# Patient Record
Sex: FEMALE | Race: WHITE | Employment: UNEMPLOYED | URBAN - NONMETROPOLITAN AREA
[De-identification: names, ages, dates, MRNs, and addresses within clinical notes are randomized per-mention and may not be internally consistent; named-entity substitution may affect disease eponyms.]

---

## 2017-09-12 ENCOUNTER — OFFICE VISIT (OUTPATIENT)
Dept: SURGERY | Age: 57
End: 2017-09-12

## 2017-09-12 VITALS
OXYGEN SATURATION: 98 % | HEART RATE: 78 BPM | WEIGHT: 206 LBS | TEMPERATURE: 97 F | DIASTOLIC BLOOD PRESSURE: 80 MMHG | BODY MASS INDEX: 29.49 KG/M2 | HEIGHT: 70 IN | SYSTOLIC BLOOD PRESSURE: 120 MMHG | RESPIRATION RATE: 16 BRPM

## 2017-09-12 DIAGNOSIS — Z12.11 COLON CANCER SCREENING: Primary | ICD-10-CM

## 2017-09-12 PROCEDURE — 99999 PR OFFICE/OUTPT VISIT,PROCEDURE ONLY: CPT | Performed by: SURGERY

## 2017-09-12 ASSESSMENT — ENCOUNTER SYMPTOMS
BLOOD IN STOOL: 0
EYE DISCHARGE: 0
COUGH: 0
VOICE CHANGE: 0
EYE REDNESS: 0
WHEEZING: 0
EYE ITCHING: 0
STRIDOR: 0
RECTAL PAIN: 0
BACK PAIN: 0
SHORTNESS OF BREATH: 0
CHOKING: 0
ABDOMINAL DISTENTION: 0
TROUBLE SWALLOWING: 0
DIARRHEA: 0
COLOR CHANGE: 0
ABDOMINAL PAIN: 0
SORE THROAT: 0
VOMITING: 0
EYE PAIN: 0
PHOTOPHOBIA: 0
RHINORRHEA: 0
SINUS PRESSURE: 0
FACIAL SWELLING: 0
APNEA: 0
ANAL BLEEDING: 0
NAUSEA: 0
CHEST TIGHTNESS: 0
CONSTIPATION: 0

## 2017-09-22 ENCOUNTER — TELEPHONE (OUTPATIENT)
Dept: SURGERY | Age: 57
End: 2017-09-22

## 2017-10-18 ENCOUNTER — HOSPITAL ENCOUNTER (OUTPATIENT)
Age: 57
Setting detail: OUTPATIENT SURGERY
Discharge: HOME | End: 2017-10-18
Attending: SURGERY | Admitting: SURGERY
Payer: COMMERCIAL

## 2017-10-18 VITALS
HEIGHT: 69 IN | OXYGEN SATURATION: 95 % | SYSTOLIC BLOOD PRESSURE: 96 MMHG | WEIGHT: 200.6 LBS | TEMPERATURE: 97.2 F | HEART RATE: 64 BPM | DIASTOLIC BLOOD PRESSURE: 68 MMHG | BODY MASS INDEX: 29.71 KG/M2 | RESPIRATION RATE: 16 BRPM

## 2017-10-18 PROCEDURE — 99152 MOD SED SAME PHYS/QHP 5/>YRS: CPT | Performed by: SURGERY

## 2017-10-18 PROCEDURE — 7100000000 HC PACU RECOVERY - FIRST 15 MIN: Performed by: SURGERY

## 2017-10-18 PROCEDURE — 88305 TISSUE EXAM BY PATHOLOGIST: CPT

## 2017-10-18 PROCEDURE — 2580000003 HC RX 258: Performed by: SURGERY

## 2017-10-18 PROCEDURE — 3609010600 HC COLONOSCOPY POLYPECTOMY SNARE/COLD BIOPSY: Performed by: SURGERY

## 2017-10-18 PROCEDURE — 99153 MOD SED SAME PHYS/QHP EA: CPT | Performed by: SURGERY

## 2017-10-18 PROCEDURE — 6360000002 HC RX W HCPCS: Performed by: SURGERY

## 2017-10-18 PROCEDURE — 45380 COLONOSCOPY AND BIOPSY: CPT | Performed by: SURGERY

## 2017-10-18 RX ORDER — 0.9 % SODIUM CHLORIDE 0.9 %
10 VIAL (ML) INJECTION PRN
Status: DISCONTINUED | OUTPATIENT
Start: 2017-10-18 | End: 2017-10-18 | Stop reason: HOSPADM

## 2017-10-18 RX ORDER — ASCORBIC ACID 500 MG
500 TABLET ORAL DAILY
COMMUNITY

## 2017-10-18 RX ORDER — FENTANYL CITRATE 50 UG/ML
INJECTION, SOLUTION INTRAMUSCULAR; INTRAVENOUS PRN
Status: DISCONTINUED | OUTPATIENT
Start: 2017-10-18 | End: 2017-10-18 | Stop reason: HOSPADM

## 2017-10-18 RX ORDER — MIDAZOLAM HYDROCHLORIDE 1 MG/ML
INJECTION INTRAMUSCULAR; INTRAVENOUS PRN
Status: DISCONTINUED | OUTPATIENT
Start: 2017-10-18 | End: 2017-10-18 | Stop reason: HOSPADM

## 2017-10-18 RX ORDER — 0.9 % SODIUM CHLORIDE 0.9 %
10 VIAL (ML) INJECTION EVERY 12 HOURS SCHEDULED
Status: DISCONTINUED | OUTPATIENT
Start: 2017-10-18 | End: 2017-10-18 | Stop reason: HOSPADM

## 2017-10-18 RX ORDER — FLUTICASONE PROPIONATE 110 UG/1
1 AEROSOL, METERED RESPIRATORY (INHALATION) 2 TIMES DAILY
COMMUNITY

## 2017-10-18 RX ORDER — SODIUM CHLORIDE 450 MG/100ML
INJECTION, SOLUTION INTRAVENOUS CONTINUOUS
Status: DISCONTINUED | OUTPATIENT
Start: 2017-10-18 | End: 2017-10-18 | Stop reason: HOSPADM

## 2017-10-18 RX ADMIN — SODIUM CHLORIDE: 4.5 INJECTION, SOLUTION INTRAVENOUS at 09:46

## 2017-10-18 NOTE — H&P
[address] [address]  Sedation/Analgesia History & Physical  Dr. Charisse Santo    Pt Name: Ashley Fink  MRN: [id number]  YOB: 1960  Provider Performing Procedure: [place] May  Primary Care Physician: Ish Marquez NP  PRE-PROCEDURE   DNR-CCA/DNR-CC - No  Brief History/Pre-Procedure Diagnosis: screening    MEDICAL HISTORY       has no past medical history on file. SURGICAL HISTORY   has no past surgical history on file. ALLERGIES   Allergies as of 09/12/2017    (No Known Allergies)     MEDICATIONS   Coumadin Use Last 7 Days: No  Antiplatelet drug therapy use last 7 days: No  Other anticoagulant use last 7 days: No  Prior to Admission medications    Medication Sig Start Date End Date Taking? Authorizing Provider   vitamin C (ASCORBIC ACID) 500 MG tablet Take 500 mg by mouth daily   Yes Historical Provider, MD   fluticasone (FLOVENT HFA) 110 MCG/ACT inhaler Inhale 1 puff into the lungs 2 times daily   Yes Historical Provider, MD   Oxymetazoline HCl (NASAL SPRAY NA) by Nasal route   Yes Historical Provider, MD   Omega-3 Fatty Acids (FISH OIL PO) Take by mouth daily   Yes Historical Provider, MD   Multiple Vitamins-Minerals (THERAPEUTIC MULTIVITAMIN-MINERALS) tablet Take 1 tablet by mouth daily. Yes Historical Provider, MD   ibuprofen (ADVIL;MOTRIN) 800 MG tablet Take 800 mg by mouth every 6 hours as needed for Pain. Historical Provider, MD     PHYSICAL EXAMINATION   Vitals:  height is 5' 9\" (1.753 m) and weight is 200 lb 9.6 oz (91 kg). Her temperature is 97.7 °F (36.5 °C). Her blood pressure is 126/83 and her pulse is 66. Her respiration is 20 and oxygen saturation is 97%.    Mental Status: alert, cooperative  Heart:   Heart regular rate and rhythm     Lungs:  Clear      Abdomen:  Soft, nontender       PLANNED PROCEDURE   Colonoscopy  SEDATION   Planned agent for conscious sedation: Fentanyl, Versed  ASA Classification: 2  Class 1: A normal healthy

## 2017-11-06 DIAGNOSIS — Z12.11 COLON CANCER SCREENING: ICD-10-CM

## 2018-08-02 ENCOUNTER — HOSPITAL ENCOUNTER (OUTPATIENT)
Age: 58
Discharge: HOME | End: 2018-08-02
Payer: COMMERCIAL

## 2018-08-02 ENCOUNTER — HOSPITAL ENCOUNTER (OUTPATIENT)
Dept: GENERAL RADIOLOGY | Age: 58
Discharge: HOME | End: 2018-08-02
Payer: COMMERCIAL

## 2018-08-02 DIAGNOSIS — R06.02 BREATH SHORTNESS: ICD-10-CM

## 2018-08-02 PROCEDURE — 71046 X-RAY EXAM CHEST 2 VIEWS: CPT

## 2018-08-02 PROCEDURE — 2709999900 HC NON-CHARGEABLE SUPPLY

## 2018-09-11 ENCOUNTER — HOSPITAL ENCOUNTER (OUTPATIENT)
Age: 58
Discharge: HOME | End: 2018-09-11
Payer: COMMERCIAL

## 2018-09-11 LAB
APTT: 33.1 SECONDS (ref 22–38)
BASOPHILS # BLD: 0.6 %
BASOPHILS ABSOLUTE: 0 THOU/MM3 (ref 0–0.1)
EOSINOPHIL # BLD: 3.1 %
EOSINOPHILS ABSOLUTE: 0.2 THOU/MM3 (ref 0–0.4)
ERYTHROCYTE [DISTWIDTH] IN BLOOD BY AUTOMATED COUNT: 12.3 % (ref 11.5–14.5)
ERYTHROCYTE [DISTWIDTH] IN BLOOD BY AUTOMATED COUNT: 40 FL (ref 35–45)
HCT VFR BLD CALC: 45.2 % (ref 37–47)
HEMOGLOBIN: 15.7 GM/DL (ref 12–16)
IMMATURE GRANS (ABS): 0.02 THOU/MM3 (ref 0–0.07)
IMMATURE GRANULOCYTES: 0.3 %
INR BLD: 1 (ref 0.85–1.13)
LYMPHOCYTES # BLD: 30.8 %
LYMPHOCYTES ABSOLUTE: 2 THOU/MM3 (ref 1–4.8)
MCH RBC QN AUTO: 30.8 PG (ref 26–33)
MCHC RBC AUTO-ENTMCNC: 34.7 GM/DL (ref 32.2–35.5)
MCV RBC AUTO: 88.6 FL (ref 81–99)
MONOCYTES # BLD: 6.9 %
MONOCYTES ABSOLUTE: 0.4 THOU/MM3 (ref 0.4–1.3)
NUCLEATED RED BLOOD CELLS: 0 /100 WBC
PLATELET # BLD: 238 THOU/MM3 (ref 130–400)
PMV BLD AUTO: 10.5 FL (ref 9.4–12.4)
RBC # BLD: 5.1 MILL/MM3 (ref 4.2–5.4)
SEG NEUTROPHILS: 58.3 %
SEGMENTED NEUTROPHILS ABSOLUTE COUNT: 3.8 THOU/MM3 (ref 1.8–7.7)
WBC # BLD: 6.5 THOU/MM3 (ref 4.8–10.8)

## 2018-09-11 PROCEDURE — 86255 FLUORESCENT ANTIBODY SCREEN: CPT

## 2018-09-11 PROCEDURE — 85025 COMPLETE CBC W/AUTO DIFF WBC: CPT

## 2018-09-11 PROCEDURE — 85730 THROMBOPLASTIN TIME PARTIAL: CPT

## 2018-09-11 PROCEDURE — 85610 PROTHROMBIN TIME: CPT

## 2018-09-11 PROCEDURE — 36415 COLL VENOUS BLD VENIPUNCTURE: CPT

## 2018-09-11 PROCEDURE — 86003 ALLG SPEC IGE CRUDE XTRC EA: CPT

## 2018-09-13 LAB
ALLERGEN BERMUDA GRASS IGE: < 0.1 KU/L
ALLERGEN BIRCH IGE: < 0.1 KU/L
ALLERGEN BOX ELDER: < 0.1 KU/L
ALLERGEN CAT DANDER IGE: < 0.1 KU/L
ALLERGEN COMMON SHORT RAGWEED IGE: < 0.1 KU/L
ALLERGEN COTTONWOOD: < 0.1 KU/L
ALLERGEN D. FARINAE (DUST MITE): < 0.1 KU/L
ALLERGEN DOG DANDER IGE: < 0.1 KU/L
ALLERGEN ELM IGE: < 0.1 KU/L
ALLERGEN FUNGI/MOLD A. ALTERNATA IGE: < 0.1 KU/L
ALLERGEN FUNGI/MOLD A. FUMIGATUS IGE: < 0.1 KU/L
ALLERGEN FUNGI/MOLD M.RACEMOSUS IGE: < 0.1 KU/L
ALLERGEN FUNGI/MOLD P. NOTATUM IGE: < 0.1 KU/L
ALLERGEN GERMAN COCKROACH IGE: < 0.1 KU/L
ALLERGEN INTERPRETATION/SCORE: NORMAL
ALLERGEN MILK IGE: < 0.1 KU/L
ALLERGEN MITE DUST PTERONYSSINUS IGE: < 0.1 KU/L
ALLERGEN MOUNTAIN CEDAR: < 0.1 KU/L
ALLERGEN MOUSE EPITHELIA IGE: < 0.1 KU/L
ALLERGEN PEANUT (F13) IGE: < 0.1 KU/L
ALLERGEN PECAN TREE IGE: < 0.1 KU/L
ALLERGEN RUSSIAN THISTLE IGE: < 0.1 KU/L
ALLERGEN SHEEP SORREL (W18) IGE: < 0.1 KU/L
ALLERGEN TIMOTHY GRASS: < 0.1 KU/L
ALLERGEN TREE SYCAMORE: < 0.1 KU/L
ALLERGEN WALNUT TREE IGE: < 0.1 KU/L
ALLERGEN WEED, PIGWEED IGE: < 0.1 KU/L
ALLERGEN WHITE ASH: < 0.1 KU/L
ALLERGEN WHITE MULBERRY TREE, IGE: < 0.1 KU/L
ALLERGEN, FUNGI/MOLD: < 0.1 KU/L
ALLERGEN, TREE, OAK: < 0.1 KU/L
IMMUNOGLOBULIN E: 37 KU/L
NEUTROPHIL CYTOPLASMIC AB IGG: < 1

## 2018-09-21 ENCOUNTER — HOSPITAL ENCOUNTER (OUTPATIENT)
Dept: MAMMOGRAPHY | Age: 58
Discharge: HOME | End: 2018-09-21
Payer: COMMERCIAL

## 2018-09-21 DIAGNOSIS — Z12.39 SCREENING BREAST EXAMINATION: ICD-10-CM

## 2018-09-21 PROCEDURE — 77063 BREAST TOMOSYNTHESIS BI: CPT

## 2019-09-24 ENCOUNTER — HOSPITAL ENCOUNTER (OUTPATIENT)
Dept: MAMMOGRAPHY | Age: 59
Discharge: HOME | End: 2019-09-24
Payer: COMMERCIAL

## 2019-09-24 DIAGNOSIS — Z12.39 BREAST SCREENING: ICD-10-CM

## 2019-09-24 PROCEDURE — 77063 BREAST TOMOSYNTHESIS BI: CPT

## 2020-11-16 ENCOUNTER — HOSPITAL ENCOUNTER (OUTPATIENT)
Dept: MAMMOGRAPHY | Age: 60
Discharge: HOME | End: 2020-11-16
Payer: COMMERCIAL

## 2020-11-16 PROCEDURE — 77063 BREAST TOMOSYNTHESIS BI: CPT

## 2021-11-22 ENCOUNTER — HOSPITAL ENCOUNTER (OUTPATIENT)
Dept: MAMMOGRAPHY | Age: 61
Discharge: HOME | End: 2021-11-22
Payer: COMMERCIAL

## 2021-11-22 DIAGNOSIS — Z12.39 BREAST SCREENING: ICD-10-CM

## 2021-11-22 PROCEDURE — 77063 BREAST TOMOSYNTHESIS BI: CPT

## 2022-11-03 ENCOUNTER — HOSPITAL ENCOUNTER (OUTPATIENT)
Age: 62
Discharge: HOME | End: 2022-11-03
Payer: COMMERCIAL

## 2022-11-03 ENCOUNTER — HOSPITAL ENCOUNTER (OUTPATIENT)
Dept: CT IMAGING | Age: 62
Discharge: HOME | End: 2022-11-03
Payer: COMMERCIAL

## 2022-11-03 DIAGNOSIS — R10.32 LEFT LOWER QUADRANT PAIN: ICD-10-CM

## 2022-11-03 LAB
CREATININE, WHOLE BLOOD: 0.9 MG/DL (ref 0.5–1.2)
ESTIMATED GFR, PCACC: > 60 ML/MIN/1.73M2

## 2022-11-03 PROCEDURE — 74177 CT ABD & PELVIS W/CONTRAST: CPT

## 2022-11-03 PROCEDURE — 6360000004 HC RX CONTRAST MEDICATION: Performed by: NURSE PRACTITIONER

## 2022-11-03 RX ADMIN — IOPAMIDOL 100 ML: 755 INJECTION, SOLUTION INTRAVENOUS at 15:39

## 2022-11-07 ENCOUNTER — TELEPHONE (OUTPATIENT)
Dept: SURGERY | Age: 62
End: 2022-11-07

## 2022-11-07 NOTE — TELEPHONE ENCOUNTER
Called patient to schedule a NP (but est. Patient) appt with [facility] referral for Diverticulitis

## 2022-11-18 ENCOUNTER — OFFICE VISIT (OUTPATIENT)
Dept: SURGERY | Age: 62
End: 2022-11-18
Payer: COMMERCIAL

## 2022-11-18 VITALS
HEART RATE: 70 BPM | OXYGEN SATURATION: 98 % | BODY MASS INDEX: 29.55 KG/M2 | WEIGHT: 195 LBS | SYSTOLIC BLOOD PRESSURE: 138 MMHG | HEIGHT: 68 IN | TEMPERATURE: 97.6 F | DIASTOLIC BLOOD PRESSURE: 74 MMHG

## 2022-11-18 DIAGNOSIS — K57.32 DIVERTICULITIS OF LARGE INTESTINE WITHOUT PERFORATION OR ABSCESS WITHOUT BLEEDING: Primary | ICD-10-CM

## 2022-11-18 PROCEDURE — 99213 OFFICE O/P EST LOW 20 MIN: CPT | Performed by: SURGERY

## 2022-11-18 NOTE — LETTER
Mason De La Torre DO  [address] [facility]. [address]  [place] [address]  [phone]     Pt Name: Luzma Quevedo  Medical Record Number: [id number]  Date of Birth 1960   Today's Date: 11/18/2022    [facility] was evaluated in the office today. My assessment and plans are listed below. ASSESSMENT      Diagnosis Orders   1. Diverticulitis of large intestine without perforation or abscess without bleeding          Past Medical History:   Diagnosis Date    Diverticulitis 2022    Sleep apnea          PLANS:   1. First episode of uncomplicated diverticulitis, no perforation, abscess or bleeding. Last colonoscopy in 2017 showing only sigmoid diverticulosis. 2. No indications for repeat endoscopy given the diagnosis has already been established. 3. No indications for sigmoid colectomy with this being the first uncomplicated episode. Should she have recurrent episodes in the same sigmoid this would be reconsidered. 4. In the meantime, pt would benefit from a high fiber diet and or supplementation. If I can provide any additional assistance or you have any concerns, please feel free to contact me. Thank you for allowing to participate in the care of your patients. Sincerely,      Valorie Patel

## 2022-11-18 NOTE — PATIENT INSTRUCTIONS
High fiber diet, goal of 40 gm of dietary fiber per day. May supplement if unable to achieve by diet alone.

## 2022-11-18 NOTE — PROGRESS NOTES
Ava Ralph. [facility], [address]  [phone]  New Patient Evaluation in Office    Pt Name: Belen Horta  Date of Birth 1960   Today's Date: 11/18/2022  Medical Record Number: [id number]  Referring Provider: No ref. provider found  Primary Care Provider: LIZ Umanzor - LAURIE  Chief Complaint   Patient presents with    Surgical Consult     Est pt last seen in 2017-referred by Noemi Gallagher CNP - Acute diverticulitis. CT 11/3/22     ASSESSMENT       Diagnosis Orders   1. Diverticulitis of large intestine without perforation or abscess without bleeding          Past Medical History:   Diagnosis Date    Diverticulitis 2022    Sleep apnea           PLANS      First episode of uncomplicated diverticulitis, no perforation, abscess or bleeding. Last colonoscopy in 2017 showing only sigmoid diverticulosis. No indications for repeat endoscopy given the diagnosis has already been established. No indications for sigmoid colectomy with this being the first uncomplicated episode. Should she have recurrent episodes in the same sigmoid this would be reconsidered. In the meantime, pt would benefit from a high fiber diet and or supplementation. Vivian Hale is a 63 y.o. female seen in the consultation for evaluation of diverticulitis. She complains of 1 episodes of diverticulitis. This began  2 weeks ago. Her usual symptoms include LLQ abdominal pain. Symptoms are aggravated by activity and alleviated by pain medications and antibiotics. Her symptoms resolved within 24 hours of starting therapy. Symptoms have resolved since. She has not required admission to the hospital for these problems, has not had a previous abscess and has not had a previous percutaneous drainage. She has had a previous colonoscopy in 2017 showing sigmoid diverticulosis.    Past Medical History  Past Medical History:   Diagnosis Date Diverticulitis 2022    Sleep apnea      Past Surgical History  Past Surgical History:   Procedure Laterality Date    DC COLSC FLX W/RMVL OF TUMOR POLYP LESION SNARE TQ N/A 10/18/2017    COLONOSCOPY POLYPECTOMY SNARE/COLD BIOPSY performed by Aury Cunningham DO at [facility]     Medications  Current Outpatient Medications   Medication Sig Dispense Refill    BREO ELLIPTA 100-25 MCG/INH AEPB inhaler       vitamin C (ASCORBIC ACID) 500 MG tablet Take 500 mg by mouth daily      fluticasone (FLOVENT HFA) 110 MCG/ACT inhaler Inhale 1 puff into the lungs 2 times daily      Omega-3 Fatty Acids (FISH OIL PO) Take by mouth daily      ibuprofen (ADVIL;MOTRIN) 800 MG tablet Take 800 mg by mouth every 6 hours as needed for Pain. Multiple Vitamins-Minerals (THERAPEUTIC MULTIVITAMIN-MINERALS) tablet Take 1 tablet by mouth daily. Oxymetazoline HCl (NASAL SPRAY NA) by Nasal route (Patient not taking: Reported on 11/18/2022)       No current facility-administered medications for this visit. Allergies  has No Known Allergies. Family History  family history includes Diabetes in her brother; Heart Disease in her father and mother; High Blood Pressure in her father and mother; High Cholesterol in her father; Mental Illness in her mother. Social History   reports that she has never smoked. She has never used smokeless tobacco. She reports current alcohol use. She reports that she does not use drugs.   Health Screening Exams  Health Maintenance   Topic Date Due    Depression Screen  Never done    HIV screen  Never done    Hepatitis C screen  Never done    Cervical cancer screen  Never done    Diabetes screen  Never done    Lipids  Never done    Shingles vaccine (1 of 2) Never done    COVID-19 Vaccine (3 - Booster for Pfizer series) 06/02/2021    Flu vaccine (1) 08/01/2022    Breast cancer screen  11/22/2023    DTaP/Tdap/Td vaccine (2 - Td or Tdap) 11/17/2025    Colorectal Cancer Screen  10/18/2027    Hepatitis A vaccine  Aged Out    Hib vaccine  Aged Out    Meningococcal (ACWY) vaccine  Aged Out    Pneumococcal 0-64 years Vaccine  Aged Out     Review of Systems  Unless otherwise stated in HPI, ROS was completed and is negative. OBJECTIVE    VITALS:  height is 5' 8\" (1.727 m) and weight is 195 lb (88.5 kg). Her temperature is 97.6 °F (36.4 °C). Her blood pressure is 138/74 and her pulse is 70. Her oxygen saturation is 98%. Body mass index is 29.65 kg/m². CONSTITUTIONAL: Alert and oriented times 3, no acute distress and cooperative to examination with proper mood and affect. SKIN: Skin color, texture, turgor normal. No rashes or lesions. LYMPH: no cervical nodes, no inguinal nodes  HEENT: Head is normocephalic, atraumatic. EOMI, PERRLA. NECK: Supple, symmetrical, trachea midline, no adenopathy, thyroid symmetric, not enlarged and no tenderness, skin normal.  CHEST/LUNGS: chest symmetric with normal A/P diameter, normal respiratory rate and rhythm, lungs clear to auscultation without wheezes, rales or rhonchi. No accessory muscle use. Scars None   CARDIOVASCULAR: Heart sounds are normal.  Regular rate and rhythm without murmur, gallop or rub. Normal S1 and S2. Carotid and femoral pulses 2+/4 and equal bilaterally. ABDOMEN: Normal shape. No scar(s) present. Normal bowel sounds. No bruits. Soft, nontender, nondistended, no masses or organomegaly. no evidence of hernia. Percussion: Normal without hepatosplenomegally. RECTAL: deferred, not clinically indicated  NEUROLOGIC: There are no focalizing motor or sensory deficits. CN II-XII are grossly intact. Carolyn Sandhu EXTREMITIES: no cyanosis, no clubbing, and no edema. Thank you for the interesting evaluation. Further recommendations as listed above.        Electronically signed by Tia Conte DO on 11/18/2022 at 1:35 PM

## 2022-12-05 ENCOUNTER — HOSPITAL ENCOUNTER (OUTPATIENT)
Dept: MAMMOGRAPHY | Age: 62
Discharge: HOME | End: 2022-12-05
Payer: COMMERCIAL

## 2022-12-05 DIAGNOSIS — Z12.31 VISIT FOR SCREENING MAMMOGRAM: ICD-10-CM

## 2022-12-05 PROCEDURE — 77063 BREAST TOMOSYNTHESIS BI: CPT

## 2022-12-07 ENCOUNTER — TELEPHONE (OUTPATIENT)
Dept: SURGERY | Age: 62
End: 2022-12-07

## 2022-12-07 DIAGNOSIS — R30.0 DYSURIA: Primary | ICD-10-CM

## 2022-12-07 NOTE — TELEPHONE ENCOUNTER
Patient calling in stating she has discomfort in her vaginal area, thinks she may be having a flare up of her diverticulosis. Does she need an antibiotic or a UA?

## 2022-12-08 ENCOUNTER — NURSE ONLY (OUTPATIENT)
Dept: LAB | Age: 62
End: 2022-12-08

## 2022-12-08 LAB
BILIRUBIN URINE: NEGATIVE
BLOOD, URINE: NEGATIVE
CHARACTER, URINE: CLEAR
COLOR: YELLOW
GLUCOSE URINE: NEGATIVE MG/DL
KETONES, URINE: NEGATIVE
LEUKOCYTE ESTERASE, URINE: NEGATIVE
NITRITE, URINE: NEGATIVE
PH UA: 5.5 (ref 5–9)
PROTEIN UA: NEGATIVE
SPECIFIC GRAVITY, URINE: 1.01 (ref 1–1.03)
UROBILINOGEN, URINE: 0.2 EU/DL (ref 0–1)

## 2023-01-05 ENCOUNTER — NURSE ONLY (OUTPATIENT)
Dept: LAB | Age: 63
End: 2023-01-05

## 2023-01-12 LAB — CYTOLOGY THIN PREP PAP: NORMAL

## 2023-12-08 ENCOUNTER — HOSPITAL ENCOUNTER (OUTPATIENT)
Dept: MAMMOGRAPHY | Age: 63
Discharge: HOME | End: 2023-12-08
Payer: COMMERCIAL

## 2023-12-08 VITALS — HEIGHT: 68 IN | WEIGHT: 210 LBS | BODY MASS INDEX: 31.83 KG/M2

## 2023-12-08 DIAGNOSIS — Z12.39 BREAST SCREENING: ICD-10-CM

## 2023-12-08 PROCEDURE — 77063 BREAST TOMOSYNTHESIS BI: CPT

## 2024-01-05 ENCOUNTER — HOSPITAL ENCOUNTER (EMERGENCY)
Age: 64
Discharge: HOME | End: 2024-01-06
Attending: FAMILY MEDICINE
Payer: COMMERCIAL

## 2024-01-05 DIAGNOSIS — N20.1 URETERIC STONE: Primary | ICD-10-CM

## 2024-01-05 PROCEDURE — 96375 TX/PRO/DX INJ NEW DRUG ADDON: CPT

## 2024-01-05 PROCEDURE — 84703 CHORIONIC GONADOTROPIN ASSAY: CPT

## 2024-01-05 PROCEDURE — 81001 URINALYSIS AUTO W/SCOPE: CPT

## 2024-01-05 PROCEDURE — 96374 THER/PROPH/DIAG INJ IV PUSH: CPT

## 2024-01-05 PROCEDURE — 99284 EMERGENCY DEPT VISIT MOD MDM: CPT

## 2024-01-06 ENCOUNTER — APPOINTMENT (OUTPATIENT)
Dept: CT IMAGING | Age: 64
End: 2024-01-06
Payer: COMMERCIAL

## 2024-01-06 VITALS
DIASTOLIC BLOOD PRESSURE: 77 MMHG | WEIGHT: 207 LBS | BODY MASS INDEX: 31.47 KG/M2 | TEMPERATURE: 97.2 F | OXYGEN SATURATION: 97 % | RESPIRATION RATE: 18 BRPM | HEART RATE: 92 BPM | SYSTOLIC BLOOD PRESSURE: 134 MMHG

## 2024-01-06 LAB
ALBUMIN SERPL BCP-MCNC: 3.6 GM/DL (ref 3.4–5)
ALP SERPL-CCNC: 85 U/L (ref 46–116)
ALT SERPL W P-5'-P-CCNC: 49 U/L (ref 14–63)
AMORPH SED URNS QL MICRO: ABNORMAL
ANION GAP SERPL CALC-SCNC: 10 MEQ/L (ref 8–16)
AST SERPL W P-5'-P-CCNC: 23 U/L (ref 15–37)
BACTERIA URNS QL MICRO: ABNORMAL
BASOPHILS # BLD: 0.4 % (ref 0–3)
BASOPHILS ABSOLUTE: 0 THOU/MM3 (ref 0–0.1)
BILIRUB SERPL-MCNC: 0.4 MG/DL (ref 0.2–1)
BILIRUB UR QL STRIP.AUTO: ABNORMAL
BUN SERPL-MCNC: 24 MG/DL (ref 7–18)
CALCIUM SERPL-MCNC: 8.8 MG/DL (ref 8.5–10.1)
CASTS #/AREA URNS LPF: ABNORMAL /LPF
CHARACTER UR: ABNORMAL
CHLORIDE SERPL-SCNC: 104 MEQ/L (ref 98–107)
CO2 SERPL-SCNC: 24 MEQ/L (ref 21–32)
COLOR UR AUTO: ABNORMAL
CREAT SERPL-MCNC: 1.1 MG/DL (ref 0.6–1.3)
CRYSTALS VITF MICRO: ABNORMAL
EOSINOPHILS ABSOLUTE: 0.1 THOU/MM3 (ref 0–0.5)
EOSINOPHILS RELATIVE PERCENT: 0.5 % (ref 0–4)
EPI CELLS #/AREA URNS HPF: ABNORMAL /HPF
GFR SERPL CREATININE-BSD FRML MDRD: 57 ML/MIN/1.73M2
GLUCOSE SERPL-MCNC: 159 MG/DL (ref 74–106)
GLUCOSE UR QL STRIP.AUTO: NEGATIVE MG/DL
HCG UR QL: NEGATIVE
HCT VFR BLD CALC: 41.6 % (ref 37–47)
HEMOGLOBIN: 14 GM/DL (ref 12–16)
HGB UR STRIP.AUTO-MCNC: ABNORMAL MG/L
IMMATURE GRANS (ABS): 0.04 THOU/MM3 (ref 0–0.07)
IMMATURE GRANULOCYTES: 0 %
KETONES UR QL STRIP.AUTO: ABNORMAL
LACTATE: 2.1 MMOL/L (ref 0.9–1.7)
LEUKOCYTE ESTERASE UR QL STRIP.AUTO: NEGATIVE
LYMPHOCYTES # BLD AUTO: 10.6 % (ref 15–47)
LYMPHOCYTES ABSOLUTE: 1 THOU/MM3 (ref 1–4.8)
MCH RBC QN AUTO: 30.6 PG (ref 26–32)
MCHC RBC AUTO-ENTMCNC: 33.7 GM/DL (ref 31–35)
MCV RBC AUTO: 91 FL (ref 81–99)
MONOCYTES: 0.8 THOU/MM3 (ref 0.3–1.3)
MONOCYTES: 8.4 % (ref 0–12)
MUCOUS THREADS URNS QL MICRO: ABNORMAL
NITRITE UR QL STRIP.AUTO: NEGATIVE
PDW BLD-RTO: 12.5 % (ref 11.5–14.9)
PH UR STRIP.AUTO: 5.5 [PH] (ref 5–9)
PLATELET # BLD AUTO: 244 THOU/MM3 (ref 130–400)
PMV BLD AUTO: 9.8 FL (ref 9.4–12.4)
POTASSIUM SERPL-SCNC: 4.1 MEQ/L (ref 3.5–5.1)
PROT SERPL-MCNC: 7.3 GM/DL (ref 6.4–8.2)
PROT UR STRIP.AUTO-MCNC: 30 MG/DL
RBC # BLD: 4.57 MILL/MM3 (ref 4.1–5.3)
RBC #/AREA URNS HPF: > 200 /HPF
REFLEX TO URINE C & S: ABNORMAL
SEG NEUTROPHILS: 79.7 % (ref 43–75)
SEGMENTED NEUTROPHILS ABSOLUTE COUNT: 7.6 THOU/MM3 (ref 1.8–7.7)
SODIUM SERPL-SCNC: 138 MEQ/L (ref 136–145)
SP GR UR STRIP.AUTO: 1.02 (ref 1–1.03)
UROBILINOGEN UR STRIP-ACNC: 0.2 EU/DL (ref 0–1)
WBC # BLD: 9.5 THOU/MM3 (ref 4.8–10.8)
WBC # UR STRIP.AUTO: ABNORMAL /HPF

## 2024-01-06 PROCEDURE — 80053 COMPREHEN METABOLIC PANEL: CPT

## 2024-01-06 PROCEDURE — 6370000000 HC RX 637 (ALT 250 FOR IP): Performed by: FAMILY MEDICINE

## 2024-01-06 PROCEDURE — 6360000002 HC RX W HCPCS: Performed by: FAMILY MEDICINE

## 2024-01-06 PROCEDURE — 85025 COMPLETE CBC W/AUTO DIFF WBC: CPT

## 2024-01-06 PROCEDURE — 74176 CT ABD & PELVIS W/O CONTRAST: CPT

## 2024-01-06 PROCEDURE — 83605 ASSAY OF LACTIC ACID: CPT

## 2024-01-06 RX ORDER — TAMSULOSIN HYDROCHLORIDE 0.4 MG/1
0.4 CAPSULE ORAL DAILY
Qty: 5 CAPSULE | Refills: 0 | Status: SHIPPED | OUTPATIENT
Start: 2024-01-06 | End: 2024-01-11

## 2024-01-06 RX ORDER — CIPROFLOXACIN 500 MG/1
500 TABLET, FILM COATED ORAL 2 TIMES DAILY
Qty: 14 TABLET | Refills: 0 | Status: SHIPPED | OUTPATIENT
Start: 2024-01-06 | End: 2024-01-13

## 2024-01-06 RX ORDER — CIPROFLOXACIN 500 MG/1
500 TABLET, FILM COATED ORAL ONCE
Status: COMPLETED | OUTPATIENT
Start: 2024-01-06 | End: 2024-01-06

## 2024-01-06 RX ORDER — ONDANSETRON 2 MG/ML
4 INJECTION INTRAMUSCULAR; INTRAVENOUS ONCE
Status: COMPLETED | OUTPATIENT
Start: 2024-01-06 | End: 2024-01-06

## 2024-01-06 RX ORDER — 0.9 % SODIUM CHLORIDE 0.9 %
1000 INTRAVENOUS SOLUTION INTRAVENOUS ONCE
Status: DISCONTINUED | OUTPATIENT
Start: 2024-01-06 | End: 2024-01-06 | Stop reason: HOSPADM

## 2024-01-06 RX ORDER — TAMSULOSIN HYDROCHLORIDE 0.4 MG/1
0.4 CAPSULE ORAL ONCE
Status: COMPLETED | OUTPATIENT
Start: 2024-01-06 | End: 2024-01-06

## 2024-01-06 RX ADMIN — ONDANSETRON 4 MG: 2 INJECTION INTRAMUSCULAR; INTRAVENOUS at 00:21

## 2024-01-06 RX ADMIN — HYDROMORPHONE HYDROCHLORIDE 0.5 MG: 1 INJECTION, SOLUTION INTRAMUSCULAR; INTRAVENOUS; SUBCUTANEOUS at 00:21

## 2024-01-06 RX ADMIN — CIPROFLOXACIN 500 MG: 500 TABLET, FILM COATED ORAL at 02:35

## 2024-01-06 RX ADMIN — TAMSULOSIN HYDROCHLORIDE 0.4 MG: 0.4 CAPSULE ORAL at 02:35

## 2024-01-06 ASSESSMENT — PAIN SCALES - GENERAL: PAINLEVEL_OUTOF10: 4

## 2024-01-06 ASSESSMENT — ENCOUNTER SYMPTOMS
NAUSEA: 0
VOMITING: 0
ABDOMINAL PAIN: 1

## 2024-01-06 ASSESSMENT — PAIN DESCRIPTION - ORIENTATION: ORIENTATION: LEFT

## 2024-01-06 ASSESSMENT — PAIN DESCRIPTION - LOCATION: LOCATION: FLANK

## 2024-01-06 ASSESSMENT — PAIN - FUNCTIONAL ASSESSMENT
PAIN_FUNCTIONAL_ASSESSMENT: NONE - DENIES PAIN
PAIN_FUNCTIONAL_ASSESSMENT: 0-10

## 2024-01-06 NOTE — ED PROVIDER NOTES
provided half milligram of Dilaudid.  Her pain quickly improved.  Vaginal exam was performed there was no CMT tenderness.  There were no adnexal tenderness.  There is no vaginal discharge.  A CT of the abdomen and pelvis without contrast showed a 4 mm stone at the UVJ junction.  The patient continued to deny pain.  Urinalysis was performed that showed large blood.  Negative nitrite negative leukocyte Estrace.  Based on her current symptoms I will go ahead and start her on Cipro.  She was provided Flomax during her course of care.  Norco will be provided for analgesia in the outpatient setting.  I have set the patient up to call urology on Monday morning at 8:00 if provide she notes continued pain.  The patient will be dispensed a urine strainer to collect urine.    CRITICAL CARE:       CONSULTS:      PROCEDURES:  None    FINAL IMPRESSION      1. Ureteric stone          DISPOSITION/PLAN   Home.  Based on her current symptoms I will go ahead and start her on Cipro.  She was provided Flomax during her course of care.  Norco will be provided for analgesia in the outpatient setting.  I have set the patient up to call urology on Monday morning at 8:00 if provide she notes continued pain.  The patient will be dispensed a urine strainer to collect urine.    PATIENT REFERRED TO:  [facility]  [address] [place] [address]  [phone]  Call in 2 days        DISCHARGE MEDICATIONS:  New Prescriptions    CIPROFLOXACIN (CIPRO) 500 MG TABLET    Take 1 tablet by mouth 2 times daily for 7 days    TAMSULOSIN (FLOMAX) 0.4 MG CAPSULE    Take 1 capsule by mouth daily for 5 doses       (Please note that portions of this note were completed with a voice recognition program.  Efforts were made to edit the dictations but occasionally words are mis-transcribed.)    MD Perla Summers Edward R, MD  01/06/24 [id number]

## 2024-01-06 NOTE — ED NOTES
Patient verbalized understanding of discharge instructions and medications prescribed.  Denies questions or concerns at this time .

## 2024-01-06 NOTE — ED NOTES
Patient to CT Scan via wheelchair.  Patient denies needs at this time.  States pain is minimal at this time .

## 2024-01-08 ENCOUNTER — OFFICE VISIT (OUTPATIENT)
Dept: UROLOGY | Age: 64
End: 2024-01-08
Payer: COMMERCIAL

## 2024-01-08 VITALS — HEIGHT: 68 IN | BODY MASS INDEX: 31.98 KG/M2 | WEIGHT: 211 LBS | TEMPERATURE: 97.5 F

## 2024-01-08 DIAGNOSIS — N20.1 CALCULUS OF DISTAL LEFT URETER: Primary | ICD-10-CM

## 2024-01-08 DIAGNOSIS — N13.30 HYDRONEPHROSIS, LEFT: ICD-10-CM

## 2024-01-08 LAB
BILIRUBIN URINE: NEGATIVE
BLOOD URINE, POC: ABNORMAL
CHARACTER, URINE: CLEAR
COLOR, URINE: YELLOW
GLUCOSE URINE: NEGATIVE MG/DL
KETONES, URINE: NEGATIVE
LEUKOCYTE CLUMPS, URINE: NEGATIVE
NITRITE, URINE: NEGATIVE
PH, URINE: 5.5 (ref 5–9)
PROTEIN, URINE: NEGATIVE MG/DL
SPECIFIC GRAVITY, URINE: 1.01 (ref 1–1.03)
UROBILINOGEN, URINE: 0.2 EU/DL (ref 0–1)

## 2024-01-08 PROCEDURE — 99203 OFFICE O/P NEW LOW 30 MIN: CPT

## 2024-01-08 PROCEDURE — 81003 URINALYSIS AUTO W/O SCOPE: CPT

## 2024-01-08 RX ORDER — TAMSULOSIN HYDROCHLORIDE 0.4 MG/1
0.4 CAPSULE ORAL DAILY
Qty: 15 CAPSULE | Refills: 0 | Status: SHIPPED | OUTPATIENT
Start: 2024-01-08

## 2024-01-08 RX ORDER — KETOROLAC TROMETHAMINE 10 MG/1
10 TABLET, FILM COATED ORAL EVERY 6 HOURS PRN
Qty: 12 TABLET | Refills: 0 | Status: SHIPPED | OUTPATIENT
Start: 2024-01-08 | End: 2024-01-11

## 2024-01-08 NOTE — PATIENT INSTRUCTIONS
KUB and Renal US prior to follow-up to confirm stone passage.    Continue Flomax and fluid intake to support stone passage.    Toradol prescribed for pain control if needed.    Strain urine to capture stone.

## 2024-02-12 ENCOUNTER — HOSPITAL ENCOUNTER (OUTPATIENT)
Dept: ULTRASOUND IMAGING | Age: 64
Discharge: HOME | End: 2024-02-12
Payer: COMMERCIAL

## 2024-02-12 DIAGNOSIS — N13.30 HYDRONEPHROSIS, LEFT: ICD-10-CM

## 2024-02-12 DIAGNOSIS — N20.1 CALCULUS OF DISTAL LEFT URETER: ICD-10-CM

## 2024-02-12 PROCEDURE — 76775 US EXAM ABDO BACK WALL LIM: CPT

## 2024-02-20 ENCOUNTER — OFFICE VISIT (OUTPATIENT)
Dept: UROLOGY | Age: 64
End: 2024-02-20
Payer: COMMERCIAL

## 2024-02-20 VITALS
DIASTOLIC BLOOD PRESSURE: 72 MMHG | HEIGHT: 68 IN | WEIGHT: 220 LBS | SYSTOLIC BLOOD PRESSURE: 130 MMHG | BODY MASS INDEX: 33.34 KG/M2

## 2024-02-20 DIAGNOSIS — N39.3 SUI (STRESS URINARY INCONTINENCE, FEMALE): ICD-10-CM

## 2024-02-20 DIAGNOSIS — N20.0 KIDNEY STONES: Primary | ICD-10-CM

## 2024-02-20 LAB
BILIRUBIN URINE: NEGATIVE
BLOOD URINE, POC: NEGATIVE
CHARACTER, URINE: CLEAR
COLOR, URINE: YELLOW
GLUCOSE URINE: NEGATIVE MG/DL
KETONES, URINE: NEGATIVE
LEUKOCYTE CLUMPS, URINE: NEGATIVE
NITRITE, URINE: NEGATIVE
PH, URINE: 5.5 (ref 5–9)
PROTEIN, URINE: NEGATIVE MG/DL
SPECIFIC GRAVITY, URINE: 1.02 (ref 1–1.03)
UROBILINOGEN, URINE: 0.2 EU/DL (ref 0–1)

## 2024-02-20 PROCEDURE — 81003 URINALYSIS AUTO W/O SCOPE: CPT

## 2024-02-20 PROCEDURE — 99213 OFFICE O/P EST LOW 20 MIN: CPT

## 2024-02-20 NOTE — PROGRESS NOTES
Vitals:    02/20/24 0934   BP: 130/72       Constitutional: Patient in no acute distress. Alert and oriented to person, place and time.  Psychiatric: Normal mood and affect.  Pulmonary: Respiratory effort is normal, no accessory muscle use.  Cardiovascular: Normal rate, regular rhythm, and normal peripheral pulses  Abdomen: Soft, non-tender, non-distended.  Genitourinary: Bladder non-tender and not distended.      Assessment and Plan   Kidney Stones  - Stone passed since last visit! Successful ToP. Imaging reviewed and confirms passage.  - Discussed prevention at length. First stone episode likely due to poor hydration. Patient will be more intentional with hydration, particularly in the summer months and avoid foods high in oxalate. Sodium and protein parameters given. Kidney stone handout distributed.  - KUB in 1 year for kidney stone surveillance.    2.   TULIO  - Mild. Not overly bothersome. Patient is not interested in intervention at this time. Denies UUI.    *Follow-up in 1 year to review KUB.    Shawn Akins PA-C  2/20/24  Urology

## 2024-02-20 NOTE — PATIENT INSTRUCTIONS
Hydration with goal of >80 oz of water daily.   Low sodium (<2300 mg/day) and low oxalate diet.  Lemon to the diet (to adjust pH and prevent stones). Recommend 1/2 cup (4 oz) lemon juice to the morning water.  If taking calcium/vitamin D supplements then discontinue unless prescribed by specialist for a specific reason.

## 2024-12-10 ENCOUNTER — HOSPITAL ENCOUNTER (OUTPATIENT)
Dept: MAMMOGRAPHY | Age: 64
Discharge: HOME | End: 2024-12-10
Payer: COMMERCIAL

## 2024-12-10 VITALS — WEIGHT: 225 LBS | HEIGHT: 69 IN | BODY MASS INDEX: 33.33 KG/M2

## 2024-12-10 DIAGNOSIS — Z12.39 BREAST SCREENING: ICD-10-CM

## 2024-12-10 PROCEDURE — 77063 BREAST TOMOSYNTHESIS BI: CPT

## 2025-02-24 DIAGNOSIS — N20.0 KIDNEY STONES: Primary | ICD-10-CM

## 2025-02-26 ENCOUNTER — HOSPITAL ENCOUNTER (OUTPATIENT)
Dept: GENERAL RADIOLOGY | Age: 65
Discharge: HOME | End: 2025-02-26
Payer: COMMERCIAL

## 2025-02-26 ENCOUNTER — HOSPITAL ENCOUNTER (OUTPATIENT)
Age: 65
Discharge: HOME | End: 2025-02-26
Payer: COMMERCIAL

## 2025-02-26 DIAGNOSIS — N20.0 KIDNEY STONES: ICD-10-CM

## 2025-02-26 PROCEDURE — 74018 RADEX ABDOMEN 1 VIEW: CPT

## 2025-03-04 ENCOUNTER — OFFICE VISIT (OUTPATIENT)
Dept: UROLOGY | Age: 65
End: 2025-03-04
Payer: COMMERCIAL

## 2025-03-04 VITALS — BODY MASS INDEX: 33.33 KG/M2 | HEIGHT: 69 IN | WEIGHT: 225 LBS | RESPIRATION RATE: 10 BRPM

## 2025-03-04 DIAGNOSIS — N20.0 KIDNEY STONES: Primary | ICD-10-CM

## 2025-03-04 DIAGNOSIS — N39.3 SUI (STRESS URINARY INCONTINENCE, FEMALE): ICD-10-CM

## 2025-03-04 PROCEDURE — 99213 OFFICE O/P EST LOW 20 MIN: CPT

## 2025-03-04 RX ORDER — AMOXICILLIN 250 MG/1
250 CAPSULE ORAL 3 TIMES DAILY
COMMUNITY

## 2025-03-04 RX ORDER — NICOTINE 14MG/24HR
PATCH, TRANSDERMAL 24 HOURS TRANSDERMAL
COMMUNITY

## 2025-03-04 NOTE — PROGRESS NOTES
[facility] [place] [facility]  [facility]  [address]  [address]  [facility] [address]  Dept: [phone]  Loc: [phone]  Visit Date: 3/4/2025      Patient:  Mickie Quiñones  YOB: 1960  Date: 3/4/2025    HPI:   The patient is a 64 y.o. female who presents to the urology clinic today for kidney stone follow-up.    Single kidney stone episode in January 2023. Possible second episode in November 2024, pain resolved within 3 days. Doing better on water intake, up to 50-60 oz on average from 25 oz/daily prior to her stone episode. Adhering to low oxalate diet.    Also with mild TULIO with coughing and sneezing. Not overly bothersome.        Last BUN and creatinine:  Lab Results   Component Value Date    BUN 24 (H) 01/06/2024     Lab Results   Component Value Date    CREATININE 1.1 01/06/2024       Imaging Reviewed during this Office Visit:   (results were independently reviewed by physician and radiology report verified)    I independently reviewed and verified the images and reports from:    XR ABDOMEN (KUB)  Result Date: 02/26/25    FINDINGS:  Intestinal gas pattern is normal. I see no free air. No mass lesion is seen.   Kidneys are somewhat obscured.. No definite renal or ureteral calculi are seen.  There are a few phleboliths in the pelvis.     IMPRESSION:  Normal supine abdomen. No acute findings. No renal calculi seen.    Electronically signed by Dr. Anival Foss        CT ABDOMEN PELVIS WO CONTRAST    Result Date: 1/6/2024  CT abdomen and pelvis without contrast Comparison: 11/30/2022 Findings: New multifocal left lower lobe atelectasis. Stable bilateral lower lobe scarring also noted. No acute bony abnormalities. Liver, spleen and pancreas unremarkable. Gallbladder and adrenal glands unremarkable. Right kidney demonstrates no focal abnormality or renal stone. 4 mm left UVJ stone with hydronephrosis. No evidence for aortic aneurysm. No free fluid or adenopathy

## (undated) DEVICE — FORCEPS BX L L240CM DIA2.4MM RAD JAW 4 HOT FOR POLYP DISP

## (undated) DEVICE — ENDO KIT: Brand: MEDLINE INDUSTRIES, INC.

## (undated) DEVICE — SOLUTION IV 1000ML 0.45% SOD CHL PH 5 INJ USP VIAFLX PLAS

## (undated) DEVICE — TRAP POLYP ETRAP

## (undated) DEVICE — CATHETER ETER IV L1IN OD22GA POLYUR PERIPH WNG HUB SFTY SHLD

## (undated) DEVICE — SNARE POLYP SM W13MMXL240CM SHTH DIA2.4MM OVL FLX DISP

## (undated) DEVICE — FORCEPS BX L240CM JAW DIA3.2MM L CAP W/ NDL MIC MESH TOOTH

## (undated) DEVICE — SET ADMIN 25ML L117IN PMP MOD CK VLV RLER CLMP 2 SMRTSITE

## (undated) DEVICE — 2000CC GUARDIAN II: Brand: GUARDIAN

## (undated) DEVICE — IV START KIT: Brand: MEDLINE INDUSTRIES, INC.

## (undated) DEVICE — SUREFIT, DUAL DISPERSIVE ELECTRODE, CONTACT QUALITY MONITOR: Brand: SUREFIT